# Patient Record
Sex: MALE | Race: WHITE
[De-identification: names, ages, dates, MRNs, and addresses within clinical notes are randomized per-mention and may not be internally consistent; named-entity substitution may affect disease eponyms.]

---

## 2017-09-28 ENCOUNTER — HOSPITAL ENCOUNTER (OUTPATIENT)
Dept: HOSPITAL 84 - D.CT | Age: 72
Discharge: HOME | End: 2017-09-28
Attending: THORACIC SURGERY (CARDIOTHORACIC VASCULAR SURGERY)
Payer: MEDICARE

## 2017-09-28 VITALS — BODY MASS INDEX: 27.6 KG/M2

## 2017-09-28 DIAGNOSIS — I71.4: Primary | ICD-10-CM

## 2017-10-25 ENCOUNTER — HOSPITAL ENCOUNTER (INPATIENT)
Dept: HOSPITAL 84 - D.SDCHOLD | Age: 72
LOS: 2 days | Discharge: HOME | DRG: 269 | End: 2017-10-27
Attending: THORACIC SURGERY (CARDIOTHORACIC VASCULAR SURGERY) | Admitting: THORACIC SURGERY (CARDIOTHORACIC VASCULAR SURGERY)
Payer: MEDICARE

## 2017-10-25 VITALS — DIASTOLIC BLOOD PRESSURE: 53 MMHG | SYSTOLIC BLOOD PRESSURE: 119 MMHG

## 2017-10-25 VITALS — DIASTOLIC BLOOD PRESSURE: 82 MMHG | SYSTOLIC BLOOD PRESSURE: 129 MMHG

## 2017-10-25 VITALS — DIASTOLIC BLOOD PRESSURE: 49 MMHG | SYSTOLIC BLOOD PRESSURE: 115 MMHG

## 2017-10-25 VITALS — DIASTOLIC BLOOD PRESSURE: 65 MMHG | SYSTOLIC BLOOD PRESSURE: 141 MMHG

## 2017-10-25 VITALS — SYSTOLIC BLOOD PRESSURE: 127 MMHG | DIASTOLIC BLOOD PRESSURE: 61 MMHG

## 2017-10-25 VITALS — SYSTOLIC BLOOD PRESSURE: 124 MMHG | DIASTOLIC BLOOD PRESSURE: 58 MMHG

## 2017-10-25 VITALS — DIASTOLIC BLOOD PRESSURE: 63 MMHG | SYSTOLIC BLOOD PRESSURE: 129 MMHG

## 2017-10-25 VITALS — DIASTOLIC BLOOD PRESSURE: 67 MMHG | SYSTOLIC BLOOD PRESSURE: 135 MMHG

## 2017-10-25 VITALS — DIASTOLIC BLOOD PRESSURE: 68 MMHG | SYSTOLIC BLOOD PRESSURE: 134 MMHG

## 2017-10-25 VITALS — DIASTOLIC BLOOD PRESSURE: 53 MMHG | SYSTOLIC BLOOD PRESSURE: 114 MMHG

## 2017-10-25 VITALS — DIASTOLIC BLOOD PRESSURE: 71 MMHG | SYSTOLIC BLOOD PRESSURE: 130 MMHG

## 2017-10-25 VITALS — SYSTOLIC BLOOD PRESSURE: 122 MMHG | DIASTOLIC BLOOD PRESSURE: 63 MMHG

## 2017-10-25 VITALS — DIASTOLIC BLOOD PRESSURE: 58 MMHG | SYSTOLIC BLOOD PRESSURE: 121 MMHG

## 2017-10-25 VITALS — SYSTOLIC BLOOD PRESSURE: 130 MMHG | DIASTOLIC BLOOD PRESSURE: 62 MMHG

## 2017-10-25 VITALS — DIASTOLIC BLOOD PRESSURE: 77 MMHG | SYSTOLIC BLOOD PRESSURE: 131 MMHG

## 2017-10-25 VITALS — DIASTOLIC BLOOD PRESSURE: 65 MMHG | SYSTOLIC BLOOD PRESSURE: 138 MMHG

## 2017-10-25 VITALS — SYSTOLIC BLOOD PRESSURE: 133 MMHG | DIASTOLIC BLOOD PRESSURE: 55 MMHG

## 2017-10-25 VITALS — BODY MASS INDEX: 34.6 KG/M2 | BODY MASS INDEX: 34.5 KG/M2 | BODY MASS INDEX: 35.9 KG/M2

## 2017-10-25 VITALS — DIASTOLIC BLOOD PRESSURE: 68 MMHG | SYSTOLIC BLOOD PRESSURE: 139 MMHG

## 2017-10-25 VITALS — DIASTOLIC BLOOD PRESSURE: 56 MMHG | SYSTOLIC BLOOD PRESSURE: 122 MMHG

## 2017-10-25 VITALS — DIASTOLIC BLOOD PRESSURE: 61 MMHG | SYSTOLIC BLOOD PRESSURE: 134 MMHG

## 2017-10-25 VITALS — SYSTOLIC BLOOD PRESSURE: 124 MMHG | DIASTOLIC BLOOD PRESSURE: 60 MMHG

## 2017-10-25 VITALS — SYSTOLIC BLOOD PRESSURE: 129 MMHG | DIASTOLIC BLOOD PRESSURE: 63 MMHG

## 2017-10-25 VITALS — SYSTOLIC BLOOD PRESSURE: 120 MMHG | DIASTOLIC BLOOD PRESSURE: 53 MMHG

## 2017-10-25 VITALS — DIASTOLIC BLOOD PRESSURE: 62 MMHG | SYSTOLIC BLOOD PRESSURE: 126 MMHG

## 2017-10-25 VITALS — SYSTOLIC BLOOD PRESSURE: 134 MMHG | DIASTOLIC BLOOD PRESSURE: 59 MMHG

## 2017-10-25 VITALS — DIASTOLIC BLOOD PRESSURE: 80 MMHG | SYSTOLIC BLOOD PRESSURE: 134 MMHG

## 2017-10-25 VITALS — SYSTOLIC BLOOD PRESSURE: 126 MMHG | DIASTOLIC BLOOD PRESSURE: 64 MMHG

## 2017-10-25 VITALS — DIASTOLIC BLOOD PRESSURE: 61 MMHG | SYSTOLIC BLOOD PRESSURE: 138 MMHG

## 2017-10-25 VITALS — DIASTOLIC BLOOD PRESSURE: 65 MMHG | SYSTOLIC BLOOD PRESSURE: 129 MMHG

## 2017-10-25 VITALS — DIASTOLIC BLOOD PRESSURE: 54 MMHG | SYSTOLIC BLOOD PRESSURE: 122 MMHG

## 2017-10-25 DIAGNOSIS — I49.1: ICD-10-CM

## 2017-10-25 DIAGNOSIS — I95.81: ICD-10-CM

## 2017-10-25 DIAGNOSIS — E78.5: ICD-10-CM

## 2017-10-25 DIAGNOSIS — I71.4: Primary | ICD-10-CM

## 2017-10-25 DIAGNOSIS — D62: ICD-10-CM

## 2017-10-25 DIAGNOSIS — K21.9: ICD-10-CM

## 2017-10-25 DIAGNOSIS — I73.9: ICD-10-CM

## 2017-10-25 DIAGNOSIS — I25.10: ICD-10-CM

## 2017-10-25 DIAGNOSIS — F17.200: ICD-10-CM

## 2017-10-25 DIAGNOSIS — I10: ICD-10-CM

## 2017-10-25 LAB
ALBUMIN SERPL-MCNC: 3.6 G/DL (ref 3.4–5)
ALP SERPL-CCNC: 91 U/L (ref 46–116)
ALT SERPL-CCNC: 54 U/L (ref 10–68)
ANION GAP SERPL CALC-SCNC: 12.5 MMOL/L (ref 8–16)
APPEARANCE UR: CLEAR
APTT BLD: 29.4 SECONDS (ref 22.8–39.4)
BASOPHILS NFR BLD AUTO: 0.6 % (ref 0–2)
BILIRUB SERPL-MCNC: 0.59 MG/DL (ref 0.2–1.3)
BILIRUB SERPL-MCNC: NEGATIVE MG/DL
BUN SERPL-MCNC: 11 MG/DL (ref 7–18)
CALCIUM SERPL-MCNC: 8.9 MG/DL (ref 8.5–10.1)
CHLORIDE SERPL-SCNC: 103 MMOL/L (ref 98–107)
CO2 SERPL-SCNC: 28 MMOL/L (ref 21–32)
COLOR UR: (no result)
CREAT SERPL-MCNC: 1.3 MG/DL (ref 0.6–1.3)
EOSINOPHIL NFR BLD: 2 % (ref 0–7)
ERYTHROCYTE [DISTWIDTH] IN BLOOD BY AUTOMATED COUNT: 18.8 % (ref 11.5–14.5)
GLOBULIN SER-MCNC: 4 G/L
GLUCOSE SERPL-MCNC: 100 MG/DL (ref 74–106)
GLUCOSE SERPL-MCNC: NEGATIVE MG/DL
HCT VFR BLD CALC: 33.4 % (ref 42–54)
HGB BLD-MCNC: 10.3 G/DL (ref 13.5–17.5)
IMM GRANULOCYTES NFR BLD: 0.1 % (ref 0–5)
INR PPP: 1.08 (ref 0.85–1.17)
KETONES UR STRIP-MCNC: (no result) MG/DL
LYMPHOCYTES NFR BLD AUTO: 22.9 % (ref 15–50)
MCH RBC QN AUTO: 23.8 PG (ref 26–34)
MCHC RBC AUTO-ENTMCNC: 30.8 G/DL (ref 31–37)
MCV RBC: 77.3 FL (ref 80–100)
MONOCYTES NFR BLD: 9 % (ref 2–11)
NEUTROPHILS NFR BLD AUTO: 65.4 % (ref 40–80)
NITRITE UR-MCNC: NEGATIVE MG/ML
OSMOLALITY SERPL CALC.SUM OF ELEC: 276 MOSM/KG (ref 275–300)
PH UR STRIP: 7 [PH] (ref 5–6)
PLATELET # BLD: 245 10X3/UL (ref 130–400)
PMV BLD AUTO: 9.3 FL (ref 7.4–10.4)
POTASSIUM SERPL-SCNC: 4.5 MMOL/L (ref 3.5–5.1)
PROT SERPL-MCNC: 7.6 G/DL (ref 6.4–8.2)
PROT UR-MCNC: NEGATIVE MG/DL
PROTHROMBIN TIME: 13.9 SECONDS (ref 11.6–15)
RBC # BLD AUTO: 4.32 10X6/UL (ref 4.2–6.1)
SODIUM SERPL-SCNC: 139 MMOL/L (ref 136–145)
SP GR UR STRIP: 1.01 (ref 1–1.02)
UROBILINOGEN UR-MCNC: NORMAL MG/DL
WBC # BLD AUTO: 7.9 10X3/UL (ref 4.8–10.8)

## 2017-10-25 PROCEDURE — 04V03DZ RESTRICTION OF ABDOMINAL AORTA WITH INTRALUMINAL DEVICE, PERCUTANEOUS APPROACH: ICD-10-PCS | Performed by: THORACIC SURGERY (CARDIOTHORACIC VASCULAR SURGERY)

## 2017-10-25 NOTE — NUR
REC'D PT FROM OR VIA BED, BILAT GROIN DRESSINGS CDI, NO SIGNS OF HEMATOMA.
GUTIERREZ CATHETER FREE OF KINKS TO GRAVITY WITH CLEAR YELLOW URINE RETURN, 4LNC.
RIGHT RADIAL A-LINE, WRIST PROTECTOR INTACT, GOOD WAVE FORM. SHIFT ASSESSMENT
COMPLETED, SEE FLOW SHEET. ROOM FREE OF CLUTTER, CALL LIGHT IN REACH, NURSE AT
THE BEDSIDE, WILL CONTINUE TO MONITOR PT.

## 2017-10-25 NOTE — NUR
REPORT RECIEVED. ASSESSMENT COMPLETED. SEE FLOW SHEET FOR FURTHER DETAILS. PT
POSITIONED FOR COMFORT. CALL LIGHT IN REACH. BED IN LOW POSITION. WILL
CONTINUE TO MONITOR.

## 2017-10-25 NOTE — NUR
DR. NOLASCO AT THE BEDSIDE SPEAKING WITH FAMILY, ALL QUESTIONS ANSWERED, VSS,
WILL CONTINUE TO MONITOR PT.

## 2017-10-26 VITALS — DIASTOLIC BLOOD PRESSURE: 53 MMHG | SYSTOLIC BLOOD PRESSURE: 121 MMHG

## 2017-10-26 VITALS — DIASTOLIC BLOOD PRESSURE: 72 MMHG | SYSTOLIC BLOOD PRESSURE: 138 MMHG

## 2017-10-26 VITALS — SYSTOLIC BLOOD PRESSURE: 114 MMHG | DIASTOLIC BLOOD PRESSURE: 59 MMHG

## 2017-10-26 VITALS — SYSTOLIC BLOOD PRESSURE: 129 MMHG | DIASTOLIC BLOOD PRESSURE: 66 MMHG

## 2017-10-26 VITALS — DIASTOLIC BLOOD PRESSURE: 76 MMHG | SYSTOLIC BLOOD PRESSURE: 137 MMHG

## 2017-10-26 VITALS — SYSTOLIC BLOOD PRESSURE: 121 MMHG | DIASTOLIC BLOOD PRESSURE: 65 MMHG

## 2017-10-26 VITALS — DIASTOLIC BLOOD PRESSURE: 55 MMHG | SYSTOLIC BLOOD PRESSURE: 133 MMHG

## 2017-10-26 VITALS — DIASTOLIC BLOOD PRESSURE: 76 MMHG | SYSTOLIC BLOOD PRESSURE: 151 MMHG

## 2017-10-26 VITALS — SYSTOLIC BLOOD PRESSURE: 133 MMHG | DIASTOLIC BLOOD PRESSURE: 55 MMHG

## 2017-10-26 VITALS — SYSTOLIC BLOOD PRESSURE: 121 MMHG | DIASTOLIC BLOOD PRESSURE: 56 MMHG

## 2017-10-26 VITALS — DIASTOLIC BLOOD PRESSURE: 55 MMHG | SYSTOLIC BLOOD PRESSURE: 135 MMHG

## 2017-10-26 VITALS — SYSTOLIC BLOOD PRESSURE: 133 MMHG | DIASTOLIC BLOOD PRESSURE: 56 MMHG

## 2017-10-26 VITALS — SYSTOLIC BLOOD PRESSURE: 124 MMHG | DIASTOLIC BLOOD PRESSURE: 53 MMHG

## 2017-10-26 VITALS — SYSTOLIC BLOOD PRESSURE: 111 MMHG | DIASTOLIC BLOOD PRESSURE: 50 MMHG

## 2017-10-26 VITALS — DIASTOLIC BLOOD PRESSURE: 54 MMHG | SYSTOLIC BLOOD PRESSURE: 125 MMHG

## 2017-10-26 VITALS — DIASTOLIC BLOOD PRESSURE: 56 MMHG | SYSTOLIC BLOOD PRESSURE: 132 MMHG

## 2017-10-26 VITALS — SYSTOLIC BLOOD PRESSURE: 125 MMHG | DIASTOLIC BLOOD PRESSURE: 61 MMHG

## 2017-10-26 VITALS — DIASTOLIC BLOOD PRESSURE: 67 MMHG | SYSTOLIC BLOOD PRESSURE: 124 MMHG

## 2017-10-26 VITALS — SYSTOLIC BLOOD PRESSURE: 113 MMHG | DIASTOLIC BLOOD PRESSURE: 50 MMHG

## 2017-10-26 VITALS — DIASTOLIC BLOOD PRESSURE: 57 MMHG | SYSTOLIC BLOOD PRESSURE: 126 MMHG

## 2017-10-26 VITALS — DIASTOLIC BLOOD PRESSURE: 53 MMHG | SYSTOLIC BLOOD PRESSURE: 135 MMHG

## 2017-10-26 VITALS — DIASTOLIC BLOOD PRESSURE: 54 MMHG | SYSTOLIC BLOOD PRESSURE: 126 MMHG

## 2017-10-26 VITALS — DIASTOLIC BLOOD PRESSURE: 54 MMHG | SYSTOLIC BLOOD PRESSURE: 132 MMHG

## 2017-10-26 VITALS — SYSTOLIC BLOOD PRESSURE: 135 MMHG | DIASTOLIC BLOOD PRESSURE: 54 MMHG

## 2017-10-26 VITALS — DIASTOLIC BLOOD PRESSURE: 56 MMHG | SYSTOLIC BLOOD PRESSURE: 139 MMHG

## 2017-10-26 VITALS — DIASTOLIC BLOOD PRESSURE: 66 MMHG | SYSTOLIC BLOOD PRESSURE: 120 MMHG

## 2017-10-26 VITALS — SYSTOLIC BLOOD PRESSURE: 122 MMHG | DIASTOLIC BLOOD PRESSURE: 65 MMHG

## 2017-10-26 VITALS — SYSTOLIC BLOOD PRESSURE: 129 MMHG | DIASTOLIC BLOOD PRESSURE: 58 MMHG

## 2017-10-26 VITALS — DIASTOLIC BLOOD PRESSURE: 47 MMHG | SYSTOLIC BLOOD PRESSURE: 106 MMHG

## 2017-10-26 VITALS — DIASTOLIC BLOOD PRESSURE: 55 MMHG | SYSTOLIC BLOOD PRESSURE: 132 MMHG

## 2017-10-26 VITALS — DIASTOLIC BLOOD PRESSURE: 54 MMHG | SYSTOLIC BLOOD PRESSURE: 118 MMHG

## 2017-10-26 VITALS — DIASTOLIC BLOOD PRESSURE: 68 MMHG | SYSTOLIC BLOOD PRESSURE: 128 MMHG

## 2017-10-26 VITALS — SYSTOLIC BLOOD PRESSURE: 138 MMHG | DIASTOLIC BLOOD PRESSURE: 58 MMHG

## 2017-10-26 VITALS — DIASTOLIC BLOOD PRESSURE: 56 MMHG | SYSTOLIC BLOOD PRESSURE: 127 MMHG

## 2017-10-26 VITALS — SYSTOLIC BLOOD PRESSURE: 114 MMHG | DIASTOLIC BLOOD PRESSURE: 50 MMHG

## 2017-10-26 VITALS — SYSTOLIC BLOOD PRESSURE: 136 MMHG | DIASTOLIC BLOOD PRESSURE: 57 MMHG

## 2017-10-26 VITALS — DIASTOLIC BLOOD PRESSURE: 55 MMHG | SYSTOLIC BLOOD PRESSURE: 134 MMHG

## 2017-10-26 VITALS — DIASTOLIC BLOOD PRESSURE: 75 MMHG | SYSTOLIC BLOOD PRESSURE: 135 MMHG

## 2017-10-26 VITALS — DIASTOLIC BLOOD PRESSURE: 55 MMHG | SYSTOLIC BLOOD PRESSURE: 130 MMHG

## 2017-10-26 VITALS — SYSTOLIC BLOOD PRESSURE: 135 MMHG | DIASTOLIC BLOOD PRESSURE: 55 MMHG

## 2017-10-26 VITALS — SYSTOLIC BLOOD PRESSURE: 118 MMHG | DIASTOLIC BLOOD PRESSURE: 67 MMHG

## 2017-10-26 LAB
ANION GAP SERPL CALC-SCNC: 12.2 MMOL/L (ref 8–16)
BUN SERPL-MCNC: 12 MG/DL (ref 7–18)
CALCIUM SERPL-MCNC: 7.6 MG/DL (ref 8.5–10.1)
CHLORIDE SERPL-SCNC: 101 MMOL/L (ref 98–107)
CO2 SERPL-SCNC: 27.5 MMOL/L (ref 21–32)
CREAT SERPL-MCNC: 1.2 MG/DL (ref 0.6–1.3)
ERYTHROCYTE [DISTWIDTH] IN BLOOD BY AUTOMATED COUNT: 19 % (ref 11.5–14.5)
GLUCOSE SERPL-MCNC: 121 MG/DL (ref 74–106)
HCT VFR BLD CALC: 26.4 % (ref 42–54)
HGB BLD-MCNC: 8.4 G/DL (ref 13.5–17.5)
MCH RBC QN AUTO: 24.3 PG (ref 26–34)
MCHC RBC AUTO-ENTMCNC: 31.8 G/DL (ref 31–37)
MCV RBC: 76.5 FL (ref 80–100)
OSMOLALITY SERPL CALC.SUM OF ELEC: 274 MOSM/KG (ref 275–300)
PLATELET # BLD: 204 10X3/UL (ref 130–400)
PMV BLD AUTO: 9.1 FL (ref 7.4–10.4)
POTASSIUM SERPL-SCNC: 3.7 MMOL/L (ref 3.5–5.1)
RBC # BLD AUTO: 3.45 10X6/UL (ref 4.2–6.1)
SODIUM SERPL-SCNC: 137 MMOL/L (ref 136–145)
WBC # BLD AUTO: 10.1 10X3/UL (ref 4.8–10.8)

## 2017-10-26 NOTE — HP
PATIENT: VERENICE OAKLEY                                     MEDICAL RECORD: [id number]
ACCOUNT: [id number]                                    LOCATION:[facility]
: 49                                            ADMISSION DATE: 10/25/17
                                                         
 
                             HISTORY AND PHYSICAL EXAMINATION
 
 
VERENICE Gandhi (71yo, M) ID# [id number]. Date/Time10/
01:[id number] Dept.N[facility]
ClinicProviderEDLILIBETH NOLASCO MDInsuranceMed Primary: [place]ARE-[place] (MEDICARE)
Insurance # : [id number]
Employer Name : RETIRED
Med Secondary: [employer] (MEDICARE SUPPLEMENT)
Insurance # : [id number]
Employer Name : RETIRED
Prescription: [place] - Member is eligible. Chief Complaint
Followup: Abdominal aortic aneurysm without rupture
 
RTC CTA AFRO
Patient's Care Team
Primary Care Provider: HERBERT OLEARY MD: [address] [place] [address],
Ph [phone], Fax [phone] NPI: [id number]
Patient's Pharmacies
[facility] (ERX): [address] [address], Ph [phone]
[phone], Fax [phone]
Vitals
BP:102/68 sitting R arm 10/20/2017 01:43 pmHR:80R/R 10/20/2017 01:43 pmHt:5 ft 9 in
10/20/2017 01:41 pmWt:225 lbs 10/20/2017 01:43 pmBMI:33.2 10/20/2017 01:43
pmAllergies
Reviewed Allergies
NKDAPOLLENMedications
Reviewed Medications
bisacodyl 5 mg tablet,delayed release
TK UTD.14   filledsurescriptsBreo Ellipta 100 mcg-25 mcg/dose powder for
owokbbcqpn19/18/17   filledPRIMEchlorhexidine gluconate 0.12 % bidlzenwj68/01/17  
filledPRIMEesomeprazole magnesium 40 mg capsule,delayed release
TAKE 1 CAPSULE PO ONCE DAILY17   filledPRIMEgabapentin 100 mg liblres26/10/17 
 filledPRIMEHYDROcodone 5 mg-acetaminophen 325 mg ilpbiu43/01/17  
filledPRIMEibuprofen 800 mg iqsvzz81/14/15   filledPRIMEmagnesium oxide 400 mg
tablet
TK 1 T PO QD17   filledsurescriptsmeloxicam 15 mg tablet
TK 1 T PO QAM17   filledPRIMEomeprazole 20 mg capsule,delayed bnrqpwg72/23/12 
 filledCaremarkpenicillin V potassium 500 mg ndbwtz52/30/13  
filledCaremarkpolyethylene glycol 3350 17 gram/dose oral sudfom12/11/14  
filledPRIMEPreviDent 5000 Booster Plus 1.1 % dental paste
USE UTD.14   filledsurescriptsQUEtiapine 25 mg tablet
TK 1 T PO HS07/10/17   filledPRIMEtemazepam 15 mg capsule
TK 1 TO 2 CS PO HS17   filledsurescriptstraMADol 50 mg aqqakv43/09/17  
filledPRIMEtriazolam 0.25 mg fgktgg34/01/17   filledsurescriptsvalsartan 160 mg
tablet
TK 1 T PO ONCE DAILY17   filledPRIMEvalsartan 160 mg-hydrochlorothiazide 12.5
mg tablet
TK 1 T PO QAM04/11/15   filledsurescriptsProblems
Reviewed Problems
 
Hyperlipidemia
Cyst of eyelid
 
 
 
HISTORY AND PHYSICAL                           [id number]    VERENICE OAKLEY             
 
 
Essential hypertension
Coronary arteriosclerosis in native artery
Abdominal aortic aneurysm without rupture
Gastroesophageal reflux disease
Right upper quadrant pain
Large liver
History of polyp of colon
Family History
Discussed Family History
 
Non-contributory.paternal: leukemia; maternal: htnSocial History
Discussed Social History
Cardiology
Smoking Status: Current every day smoker (Notes: smokes about three cigars daily)
High blood pressure: Y
Exercise level: Moderate
Alcohol intake: Occasional
Diet: Regular
Occupation: retired
Marital status: 
Surgical History
Reviewed Surgical History
 
Other - 2012 - left hip surgery
Past Medical History
Discussed Past Medical History
Abdominal Pain: Y - RUQ
Aneurysmn (specify): Y - LT fem. art.
Aortic Aneurysm: Y
Circulation Problems: Y
Coronary Artery Disease: Y
GERD: Y
High Blood Pressure: Y
Hyperlipidemia: Y
Hypertension: Y
Documents for Discussion
N/A
Screening
None recorded.
HPI
Peripheral Vascular Disease
Reported by patient.
Location: foot; "my neuropathy is bothering me." 
Severity: interferes with normal activity; moderate
abdominal aortic aneurysm
ROS
Patient reports exercise intolerance  but reports no fever, no night sweats, no
significant weight gain, and no significant weight loss. He reports shortness of
breath when walking  but reports no chest pain, no arm pain on exertion, no
shortness of breath when lying down, no palpitations, and no known heart murmur. He
reports shortness of breath but reports no cough, no wheezing, and no coughing up
blood. He reports no muscle aches, no muscle weakness, no arthralgias/joint pain, no
back pain, and no swelling in the extremities; aches and pains and peripheral
neuropathy. He reports no dry eyes, no irritation, and no vision change. He reports
no difficulty hearing and no ear pain. He reports no frequent nosebleeds and no
 
 
 
HISTORY AND PHYSICAL                           [id number]    CELE,VERENICE             
 
 
nose/sinus problems. He re ports no sore throat, no bleeding gums, no snoring, no
dry mouth, no mouth ulcers, no oral abnormalities, and no teeth problems. He reports
no abdominal pain, no vomiting, normal appetite, no diarrhea, not vomiting blood, no
nausea, and no constipation. H e  reports no incontinence, no difficulty urinating,
no hematuria, and no increased frequency. He reports no abnormal mole, no jaundice,
and no rashes. He reports no loss of consciousness, no weakness, no numbness, no
seizures, no dizziness, and no headache s . He reports no depression, no sleep
disturbances, feeling safe in relationship, and no alcohol abuse. He reports no
fatigue. He reports no swollen glands and no bruising. He reports no runny nose, no
sinus pressure, no itching, no hives, and no frequent sneezing. 
ROS as noted in the HPI
Physical Exam
Patient is a 72-year-old male.
 
Constitutional: General Appearance well nourished and developed and
healthy-appearing. Level of Distress NAD. Ambulation ambulating normally.
 
Cardiovascular:  Apical Impulse not displaced or no thrill. Heart Auscultation
normal s1 and s2; no murmurs, rubs, or gallops; and RRR. Arterial Pulses no
abdominal aorta bruits, femoral bruits, or popliteal bruits and 2+ bilateral,
carotid 2+ bilateral, femoral 2+ bilate ral, popliteal 2+ bilateral, and dorsalis
pedis 2+ bilateral. Edema no edema or varicosities.
 
Lungs:  Repiratory Effort no dyspnea. Percussion no hyperresonance or dullness or
flatness. Auscultation no wheezing, rhonchi, or rales / crackles and breathing
sounds normal, good air movement, and CTA except as noted.
 
Abdomen: Bowl Sounds normal. Inspection and Palpation no tenderness, guarding,
masses, or rebound tenderness and soft and non-distended; AAA NON TENDER. Liver
non-tender and no hepatomegaly. Spleen non-tender and no splenomegaly. Hernia none
palpable.
 
Ears, Nose, Throat:  Hearing grossly normal hearing. Nose no external nose lesion.
Lips, Teeth, and Gums no mouth or lip ulcers. Oropharynx: moist mucous membranes.
 
Musculoskeletal System: Gait And Stance normal gait and stance. Digits and Nails
normal nails and no cyanosis.
 
Neurologic: Cranial Nerves grossly intact. Reflexes DTRs 2+ bilaterally throughout.
Sensation grossly intact.
 
Lymph Nodes: Lymph Nodes no cervical LAD, supraclavicular LAD, axillary LAD, or
inguinal LAD.
 
Eyes: Lids and Conjunctivae no discharge or pallor and non-injected. Pupils PERRLA.
Cornea grossly intact. EOM EOMI. Lens clear. Sclerae non-icteric.
 
Neck: Neck no masses, enlarged lymph nodes, or carotid bruits and supple and trachea
midline. Thyroid no enlargement or nodules and non-tender.
 
Skin: Inspection and Palpation no rash, lesions, ulcers, jaundice, or abnormal nevi.
Assessment / Plan
1. Abdominal aortic aneurysm without rupture
I71.4: Abdominal aortic aneurysm, without rupture
ABDOMINAL AORTIC ANEURYSM: CARE INSTRUCTIONS
 
 
 
HISTORY AND PHYSICAL                           [id number]    VERENICE OAKLEY             
 
 
 
Discussion Notes
I discussed mistake his disease process with him in detail as well as the
alternative methods of treatment we discussed endovascular stent repair and ope n
abdominal aneurysm repair including the expected benefits and risks which include
bleeding, infection, stroke, death, and the imponderables. He understands all of the
above and wishes to proceed with planned procedure
 
 
                                           
                                           VARUN NOLASCO MD             
 
 
 
Electronically Signed by VARUN NOLASCO on 10/26/17 at 1559
 
 
 
 
 
 
 
 
 
 
 
 
 
 
 
 
 
 
 
 
 
 
 
 
 
 
 
 
 
 
 
 
CC:                                                             [id number]
DICTATION DATE: 10/20/17 1330     :   10/23/17 1128      ADM IN  
                                                                              
[address] [place] [address]

## 2017-10-26 NOTE — NUR
1930: Pt incision CDI no with no s/s of bleeding, oozing, or swelling noted.
Pt denies pain, tingling, numbness, or nausea at this time. Pt temp 98.9 Oral.

## 2017-10-26 NOTE — NUR
A-LINE AND GUTIERREZ CATHETER PULLED PER ORDERS. NO ISSUES NOTED UPON REMOVAL. BED
BATH GIVEN AND ASSISTED TO RECLINER. MODERATE ASSIST REQUIRED. CALL LIGHT IN
REACH ALONG WITH PCA BUTTOM. WILL CONT TO ASSESS.

## 2017-10-26 NOTE — NUR
REASSESSMENT COMPLETED AT THIS TIME. SEE FLOW SHEET FOR FURTHER DETAILS. PT
POSITIONED FOR COMFORT WILL CONMTINUE TO MONITOR.

## 2017-10-26 NOTE — NUR
* Is the patient Alert and Oriented? Yes  0
* How many steps to enter\exit or inside your home? 0  0
* PCP Dr. Wilkins  0
* Pharmacy [facility]  0
* Preadmission Environment Home Alone  0
* ADLs Independent  0
* List name and contact numbers for known caregivers / representatives who
currently or will assist patient after discharge: Doug Carmona
[phone]  0
* Additional services required to return to the preadmission environment? No
0
* Can the patient safely return to the preadmission environment? Yes  0
* Has this patient been hospitalized within the prior 30 days at any hospital?
No
 
Patient Name: VERENICE CARMONA Admission Status: Urgent
Accout number: [id number] Admission Date: 10-
: 1945 Admission Diagnosis:
Attending: VARUN NOLASCO Current LOS: 1
 
Anticipated DC Date:
10-
Planned Disposition: Home
Primary Insurance: MEDICARE A & B
 
 
Discharge Planning Comments: CM met with patient to assess dc plans/needs.
Patient states he lives alone & is independent with all ADL's & IADL's. He
does not use any DME at home or have home health services. At dc, he states
his son will drive him home. He states he has a fulltime  & a group
of family & friends who will help him with any needs at discharge. CM will
follow & assist as needed.
 
 
 
 
 
 
: Cheryl Mo

## 2017-10-26 NOTE — OP
PATIENT NAME:  VERENICE OAKLEY                               MEDICAL RECORD: [id number]
:[birth year]                                             [facility]
                                                         ADMISSION DATE:10/25/17
SURGEON:  VARUN NOLASCO MD             
 
 
DATE OF OPERATION:  10/25/2017
 
ANESTHESIA:  General endotracheal, Dr. Phan
 
OPERATION PERFORMED:  Endovascular stent repair of abdominal aortic aneurysm.
 
PREOPERATIVE DIAGNOSIS:  Large abdominal aortic aneurysm.
 
POSTOPERATIVE DIAGNOSIS:  Large abdominal aortic aneurysm.
 
INDICATION FOR OPERATION:  Large abdominal aortic aneurysm.
 
FINDINGS AT OPERATION:  Contrast 78 cc Isovue.  Fluoroscopy time was 8 minutes 4
seconds.
 
ESTIMATED BLOOD LOSS:  Less than 50 mL
 
The endovascular stent graft was an Endurant II stent graft system.
 
OPERATIVE PROCEDURE:
1.  Open femoral artery exposure bilaterally, 27662-95.
2.  Catheter sheath placement into the aorta, nonselective, 74438-32.
3.  Endovascular AAA repair with modular bifurcated device, one docking limb,
54631.
a.  Rad S&I, 48598-77.
4.  Repair of blood vessel direct bilaterally, 50294-31.
 
The main body was a 28 x 13 x 166 in the ipsilateral right side.  The docking
limb 16 x 13 x 124 into the contra left.
 
DESCRIPTION OF PROCEDURE:  After informed consent and adequate preoperative
medication evaluation, the patient was brought to the operating room and placed
on the table in the supine position.  After induction of general endotracheal
anesthesia and application of appropriate monitoring devices, the chest, neck,
abdomen, and both groins were prepped and draped in a sterile field utilizing
Betadine scrub, alcohol, and Betadine solution.  Betadine-impregnated drape was
also used.  Bilateral groin incisions were made above the inguinal ligament. 
Dissection was carried down to the fascia.  Hemostasis maintained with
electrocautery.  The inguinal ligaments were elevated bilaterally and the common
femoral arteries dissected free of surrounding structures proximally and
distally and surrounded with vessel loops.  The patient was given a calculated
dose of heparin.  Utilizing a micropuncture technique and exchange wires, the
groins were accessed with a 7-Italian sheath.  A Glidewire and pigtail catheter
were placed in the aorta just above the renal arteries.  An arteriogram obtained
and delineated the features and measurements made.  Exchange was made for a
stiff wire on the right and the bifurcated device was placed at the renal
arteries and deployed with suprarenal fixation.  Attention was then turned
toward the contralateral utilizing a Kumpe catheter and Glidewire, the
contralateral gate was accessed and exchange made for a stiff wire.  The
contralateral limb was placed.  Bilateral Reliant balloons were used to dilate
the device and aortogram performed and demonstrated no endoleaks and good
placement of the graft.  The wires, catheter, sheaths were removed and the
 
 
 
OPERATIVE REPORT                               [id number]    CELE,VERENICE             
 
 
artery was closed in 2 layers utilizing running 6-0 Prolene suture.  All
maneuvers to remove trapped air were performed.  The clamps were removed
sequentially.  The patient was given a calculated dose of protamine to reverse
the heparin.  Hemostasis was maintained.  Instrument count and sponge count were
correct times 2.  Wounds closed in layers utilizing 2-0 Vicryl on deep
subcutaneous tissue, 5-0 subcuticular Monocryl on the skin.  Sterile dressings
were applied.  The patient tolerated the procedure well and was transferred to
the CV ICU in satisfactory condition.
 
TRANSINT:[id number] Voice Confirmation ID: [id number] DOCUMENT ID: [id number]
                                           
                                           VARUN NOLASCO MD             
 
 
 
Electronically Signed by VARUN NOLASCO on 10/26/17 at 1559
 
 
 
 
 
 
 
 
 
 
 
 
 
 
 
 
 
 
 
 
 
 
 
 
 
 
 
 
 
 
 
CC:                                                             [id number]
DICTATION DATE: 10/25/17 1528     :     10/25/17 1643      ADM IN  
                                                                              
[facility]                                          
[address] [id number]

## 2017-10-26 NOTE — NUR
2000: Pt rec'd sitting in chair. Pt requested to go back to bed. Pt able to
stand without difficulty and has steady gait. Pt returned to bed and SR up x2.
Pt states; "I am supposed to go home in the morning and the doctor said I
coudl have all this stuff off." Discussed ICU policy with patient. Pt states;
"I would really appreciate it if you could disconnect all this stuff (pionting
at IVF+PCA) and pull my curtain, close the door and let me sleep" Pt states he
has not slept for 48 hours. Reviewed pt EMAR and PCA orders. Pt states "I
don't have pain, you can turn it off" Pt breathing RA RR16x with SPO2 97%.
S1S2 SR 80's on CM with -130. Attempted to minimize all care per pt
request and comfort. ABD soft NT BSx4 active. Pt states he has no difficulty
with elimination.

## 2017-10-26 NOTE — NUR
REPORT RECIEVED FROM NIGHT SHIFT NURSE. PT RESTING IN BED QUIETLY. NO S/SX OF
ACUTE DISTRESS NOTED AT THIS TIME. FULL ASSESSMENT COMPLETE PER FLOWSHEET.
REFER FOR DETAILS. CALL LIGHT IN REACH. WILL CONT TO ASSESS.

## 2017-10-27 VITALS — SYSTOLIC BLOOD PRESSURE: 155 MMHG | DIASTOLIC BLOOD PRESSURE: 53 MMHG

## 2017-10-27 VITALS — SYSTOLIC BLOOD PRESSURE: 147 MMHG | DIASTOLIC BLOOD PRESSURE: 87 MMHG

## 2017-10-27 VITALS — SYSTOLIC BLOOD PRESSURE: 144 MMHG | DIASTOLIC BLOOD PRESSURE: 92 MMHG

## 2017-10-27 VITALS — SYSTOLIC BLOOD PRESSURE: 141 MMHG | DIASTOLIC BLOOD PRESSURE: 76 MMHG

## 2017-10-27 VITALS — SYSTOLIC BLOOD PRESSURE: 144 MMHG | DIASTOLIC BLOOD PRESSURE: 80 MMHG

## 2017-10-27 VITALS — SYSTOLIC BLOOD PRESSURE: 143 MMHG | DIASTOLIC BLOOD PRESSURE: 85 MMHG

## 2017-10-27 VITALS — DIASTOLIC BLOOD PRESSURE: 83 MMHG | SYSTOLIC BLOOD PRESSURE: 155 MMHG

## 2017-10-27 VITALS — DIASTOLIC BLOOD PRESSURE: 82 MMHG | SYSTOLIC BLOOD PRESSURE: 139 MMHG

## 2017-10-27 VITALS — DIASTOLIC BLOOD PRESSURE: 85 MMHG | SYSTOLIC BLOOD PRESSURE: 119 MMHG

## 2017-10-27 VITALS — SYSTOLIC BLOOD PRESSURE: 155 MMHG | DIASTOLIC BLOOD PRESSURE: 88 MMHG

## 2017-10-27 NOTE — NUR
0330: Pt sitting up in bed at this time. Pt without c/o at this time. Remains
Afebrile 98.1F oral temp. SR 80-90bpm on CM with . Pt remains on RA
RR19x with SPO2 94%. Pt groin sites unchanged from assessment. No bleeding or
oozing noted. PPPx4. (R>L in quality)

## 2017-10-27 NOTE — NUR
0630: Pt resting with eyes closed at this time, easily arousable via verbal.
IS done per RT with max TV 1200cc achieved. Pt remains RA RR16x with SP02 92%
at this time. Encouraged DBC. Pt remains SR with occasional PACs seen on CM
80bpm's. Pt remains Afebrile with T98.4F oral.

## 2017-10-27 NOTE — NUR
REPORT RECIEVED FROM NIGHT SHIFT NURSE. FULL ASSESSMENT COMPLETE PER
FLOWSHEET. PT RESTING IN CHAIR QUIETLY. NO S/SX OF ACUTE DISTRESS NOTED AT
THIS TIME. VSS. CALL LIGHT IN REACH. WILL CONT PLAN OF CARE.

## 2017-11-12 NOTE — DS
PATIENT:VERENICE OAKLEY                       :[birth year]   MEDICAL RECORD: [id number]
 
                              DISCHARGE SUMMARY
                                                         
ADMISSION DATE:    10/25/17                       DISCHARGE DATE:     10/27/17
 
 
DISCHARGE DIAGNOSES:
1.  Large abdominal aortic aneurysm.
2.  Posthemorrhagic anemia.
3.  Coronary artery disease.
4.  Hypertension.
5.  Hyperlipidemia.
6.  Peripheral arterial disease.
7.  Atrial premature depolarization.
 
DISCHARGE MEDICATIONS:  Please see medical reconciliation form.
 
DISPOSITION:  The patient discharged home, has an appointment to see Dr. Nino
in 2-3 weeks and appointment to see Dr. Wilkins to be arranged.
 
HOSPITAL COURSE:  The patient was admitted to the hospital and underwent
endovascular stent repair of his abdominal aortic aneurysm.  Postoperatively, he
did well, had no problems of bleeding, infection or arrhythmias.  On his final
postoperative day in the hospital, he is ambulating and taking a diet and eager
to go home.  He has been given discharge instructions and wound precautions and
will be seen as above.
 
TRANSINT:[id number] Voice Confirmation ID: [id number] DOCUMENT ID: [id number]
                                           
                                           VARUN NINO MD             
 
 
 
Electronically Signed by VARUN NINO on 17 at 1206
 
 
 
 
 
 
 
 
 
 
 
 
 
 
 
CC:                                                             [id number]
DICTATION DATE: 17 1326     :     17 1424      DIS IN  
                                                                      10/27/17
[address] [place] [address]

## 2019-02-27 ENCOUNTER — HOSPITAL ENCOUNTER (OUTPATIENT)
Dept: HOSPITAL 84 - D.CT | Age: 74
Discharge: HOME | End: 2019-02-27
Attending: THORACIC SURGERY (CARDIOTHORACIC VASCULAR SURGERY)
Payer: MEDICARE

## 2019-02-27 VITALS — BODY MASS INDEX: 35.9 KG/M2

## 2019-02-27 DIAGNOSIS — Z98.890: Primary | ICD-10-CM
